# Patient Record
Sex: FEMALE | Race: ASIAN
[De-identification: names, ages, dates, MRNs, and addresses within clinical notes are randomized per-mention and may not be internally consistent; named-entity substitution may affect disease eponyms.]

---

## 2020-07-22 ENCOUNTER — APPOINTMENT (OUTPATIENT)
Dept: CARDIOLOGY | Facility: CLINIC | Age: 65
End: 2020-07-22
Payer: MEDICARE

## 2020-07-22 VITALS
RESPIRATION RATE: 15 BRPM | DIASTOLIC BLOOD PRESSURE: 76 MMHG | HEIGHT: 60 IN | HEART RATE: 76 BPM | BODY MASS INDEX: 21.2 KG/M2 | SYSTOLIC BLOOD PRESSURE: 109 MMHG | WEIGHT: 108 LBS

## 2020-07-22 DIAGNOSIS — Z82.49 FAMILY HISTORY OF ISCHEMIC HEART DISEASE AND OTHER DISEASES OF THE CIRCULATORY SYSTEM: ICD-10-CM

## 2020-07-22 PROBLEM — Z00.00 ENCOUNTER FOR PREVENTIVE HEALTH EXAMINATION: Status: ACTIVE | Noted: 2020-07-22

## 2020-07-22 PROCEDURE — 93000 ELECTROCARDIOGRAM COMPLETE: CPT

## 2020-07-22 PROCEDURE — 99203 OFFICE O/P NEW LOW 30 MIN: CPT

## 2020-07-22 NOTE — PHYSICAL EXAM
[Normal Appearance] : normal appearance [General Appearance - Well Developed] : well developed [General Appearance - Well Nourished] : well nourished [No Deformities] : no deformities [Well Groomed] : well groomed [General Appearance - In No Acute Distress] : no acute distress [Normal Conjunctiva] : the conjunctiva exhibited no abnormalities [Normal Oral Mucosa] : normal oral mucosa [Eyelids - No Xanthelasma] : the eyelids demonstrated no xanthelasmas [No Oral Pallor] : no oral pallor [Normal Jugular Venous A Waves Present] : normal jugular venous A waves present [No Oral Cyanosis] : no oral cyanosis [Normal Jugular Venous V Waves Present] : normal jugular venous V waves present [No Jugular Venous [place] A Waves] : no jugular venous [place] A waves [Heart Rate And Rhythm] : heart rate and rhythm were normal [Heart Sounds] : normal S1 and S2 [FreeTextEntry1] : 1/6 ADITYA QUICK [Exaggerated Use Of Accessory Muscles For Inspiration] : no accessory muscle use [Respiration, Rhythm And Depth] : normal respiratory rhythm and effort [Auscultation Breath Sounds / Voice Sounds] : lungs were clear to auscultation bilaterally [Abdomen Soft] : soft [Abdomen Tenderness] : non-tender [Abdomen Mass (___ Cm)] : no abdominal mass palpated [Nail Clubbing] : no clubbing of the fingernails [Petechial Hemorrhages (___cm)] : no petechial hemorrhages [Cyanosis, Localized] : no localized cyanosis [Skin Color & Pigmentation] : normal skin color and pigmentation [] : no ischemic changes [No Venous Stasis] : no venous stasis [No Skin Ulcers] : no skin ulcer [Skin Lesions] : no skin lesions [No Xanthoma] : no  xanthoma was observed [Oriented To Time, Place, And Person] : oriented to person, place, and time [Mood] : the mood was normal [Affect] : the affect was normal [No Anxiety] : not feeling anxious

## 2020-07-22 NOTE — REASON FOR VISIT
[Initial Evaluation] : an initial evaluation of [FreeTextEntry1] : 64 F referred for cardiology evaluation.

## 2020-07-22 NOTE — HISTORY OF PRESENT ILLNESS
[FreeTextEntry1] : Patient denies CP or SOB.\par No palpitations or dizziness noted.\par ET is stable. \par No cough or fevers noted.\par She is on no medications.

## 2020-07-22 NOTE — DISCUSSION/SUMMARY
[FreeTextEntry1] : 64 F for f/u.\par Patient denies any CP or SOB.\par Small soft ADITYA heard on exam, likely flow murmur.\par Reassurance and f/u for now.

## 2023-07-31 ENCOUNTER — INPATIENT (INPATIENT)
Facility: HOSPITAL | Age: 68
LOS: 1 days | Discharge: HOME | DRG: 603 | End: 2023-08-02
Attending: STUDENT IN AN ORGANIZED HEALTH CARE EDUCATION/TRAINING PROGRAM | Admitting: STUDENT IN AN ORGANIZED HEALTH CARE EDUCATION/TRAINING PROGRAM
Payer: MEDICARE

## 2023-07-31 VITALS
HEART RATE: 80 BPM | SYSTOLIC BLOOD PRESSURE: 104 MMHG | DIASTOLIC BLOOD PRESSURE: 62 MMHG | HEIGHT: 62 IN | OXYGEN SATURATION: 98 % | RESPIRATION RATE: 18 BRPM | TEMPERATURE: 98 F | WEIGHT: 108.03 LBS

## 2023-07-31 DIAGNOSIS — L03.116 CELLULITIS OF LEFT LOWER LIMB: ICD-10-CM

## 2023-07-31 LAB
ALBUMIN SERPL ELPH-MCNC: 4.4 G/DL — SIGNIFICANT CHANGE UP (ref 3.5–5.2)
ALP SERPL-CCNC: 75 U/L — SIGNIFICANT CHANGE UP (ref 30–115)
ALT FLD-CCNC: 26 U/L — SIGNIFICANT CHANGE UP (ref 0–41)
ANION GAP SERPL CALC-SCNC: 11 MMOL/L — SIGNIFICANT CHANGE UP (ref 7–14)
AST SERPL-CCNC: 42 U/L — HIGH (ref 0–41)
BASOPHILS # BLD AUTO: 0.02 K/UL — SIGNIFICANT CHANGE UP (ref 0–0.2)
BASOPHILS NFR BLD AUTO: 0.4 % — SIGNIFICANT CHANGE UP (ref 0–1)
BILIRUB SERPL-MCNC: 0.3 MG/DL — SIGNIFICANT CHANGE UP (ref 0.2–1.2)
BUN SERPL-MCNC: 21 MG/DL — HIGH (ref 10–20)
CALCIUM SERPL-MCNC: 9.8 MG/DL — SIGNIFICANT CHANGE UP (ref 8.4–10.5)
CHLORIDE SERPL-SCNC: 103 MMOL/L — SIGNIFICANT CHANGE UP (ref 98–110)
CO2 SERPL-SCNC: 25 MMOL/L — SIGNIFICANT CHANGE UP (ref 17–32)
CREAT SERPL-MCNC: 0.8 MG/DL — SIGNIFICANT CHANGE UP (ref 0.7–1.5)
EGFR: 80 ML/MIN/1.73M2 — SIGNIFICANT CHANGE UP
EOSINOPHIL # BLD AUTO: 0.17 K/UL — SIGNIFICANT CHANGE UP (ref 0–0.7)
EOSINOPHIL NFR BLD AUTO: 3.1 % — SIGNIFICANT CHANGE UP (ref 0–8)
GLUCOSE SERPL-MCNC: 89 MG/DL — SIGNIFICANT CHANGE UP (ref 70–99)
HCT VFR BLD CALC: 37.4 % — SIGNIFICANT CHANGE UP (ref 37–47)
HGB BLD-MCNC: 12.4 G/DL — SIGNIFICANT CHANGE UP (ref 12–16)
IMM GRANULOCYTES NFR BLD AUTO: 0.2 % — SIGNIFICANT CHANGE UP (ref 0.1–0.3)
LACTATE SERPL-SCNC: 0.8 MMOL/L — SIGNIFICANT CHANGE UP (ref 0.7–2)
LYMPHOCYTES # BLD AUTO: 1.37 K/UL — SIGNIFICANT CHANGE UP (ref 1.2–3.4)
LYMPHOCYTES # BLD AUTO: 25 % — SIGNIFICANT CHANGE UP (ref 20.5–51.1)
MCHC RBC-ENTMCNC: 31.9 PG — HIGH (ref 27–31)
MCHC RBC-ENTMCNC: 33.2 G/DL — SIGNIFICANT CHANGE UP (ref 32–37)
MCV RBC AUTO: 96.1 FL — SIGNIFICANT CHANGE UP (ref 81–99)
MONOCYTES # BLD AUTO: 0.31 K/UL — SIGNIFICANT CHANGE UP (ref 0.1–0.6)
MONOCYTES NFR BLD AUTO: 5.6 % — SIGNIFICANT CHANGE UP (ref 1.7–9.3)
NEUTROPHILS # BLD AUTO: 3.61 K/UL — SIGNIFICANT CHANGE UP (ref 1.4–6.5)
NEUTROPHILS NFR BLD AUTO: 65.7 % — SIGNIFICANT CHANGE UP (ref 42.2–75.2)
NRBC # BLD: 0 /100 WBCS — SIGNIFICANT CHANGE UP (ref 0–0)
PLATELET # BLD AUTO: 276 K/UL — SIGNIFICANT CHANGE UP (ref 130–400)
PMV BLD: 10.3 FL — SIGNIFICANT CHANGE UP (ref 7.4–10.4)
POTASSIUM SERPL-MCNC: 5 MMOL/L — SIGNIFICANT CHANGE UP (ref 3.5–5)
POTASSIUM SERPL-SCNC: 5 MMOL/L — SIGNIFICANT CHANGE UP (ref 3.5–5)
PROT SERPL-MCNC: 7 G/DL — SIGNIFICANT CHANGE UP (ref 6–8)
RBC # BLD: 3.89 M/UL — LOW (ref 4.2–5.4)
RBC # FLD: 13.8 % — SIGNIFICANT CHANGE UP (ref 11.5–14.5)
SODIUM SERPL-SCNC: 139 MMOL/L — SIGNIFICANT CHANGE UP (ref 135–146)
WBC # BLD: 5.49 K/UL — SIGNIFICANT CHANGE UP (ref 4.8–10.8)
WBC # FLD AUTO: 5.49 K/UL — SIGNIFICANT CHANGE UP (ref 4.8–10.8)

## 2023-07-31 PROCEDURE — 83735 ASSAY OF MAGNESIUM: CPT

## 2023-07-31 PROCEDURE — 73630 X-RAY EXAM OF FOOT: CPT | Mod: 26,LT

## 2023-07-31 PROCEDURE — 99285 EMERGENCY DEPT VISIT HI MDM: CPT

## 2023-07-31 PROCEDURE — 86140 C-REACTIVE PROTEIN: CPT

## 2023-07-31 PROCEDURE — 73590 X-RAY EXAM OF LOWER LEG: CPT | Mod: 26,LT

## 2023-07-31 PROCEDURE — 80048 BASIC METABOLIC PNL TOTAL CA: CPT

## 2023-07-31 PROCEDURE — G0378: CPT

## 2023-07-31 PROCEDURE — 86803 HEPATITIS C AB TEST: CPT

## 2023-07-31 PROCEDURE — 80053 COMPREHEN METABOLIC PANEL: CPT

## 2023-07-31 PROCEDURE — 99223 1ST HOSP IP/OBS HIGH 75: CPT

## 2023-07-31 PROCEDURE — 85652 RBC SED RATE AUTOMATED: CPT

## 2023-07-31 PROCEDURE — 85025 COMPLETE CBC W/AUTO DIFF WBC: CPT

## 2023-07-31 PROCEDURE — 36415 COLL VENOUS BLD VENIPUNCTURE: CPT

## 2023-07-31 RX ORDER — KETOROLAC TROMETHAMINE 30 MG/ML
15 SYRINGE (ML) INJECTION ONCE
Refills: 0 | Status: DISCONTINUED | OUTPATIENT
Start: 2023-07-31 | End: 2023-07-31

## 2023-07-31 RX ORDER — CEFAZOLIN SODIUM 1 G
1000 VIAL (EA) INJECTION EVERY 8 HOURS
Refills: 0 | Status: DISCONTINUED | OUTPATIENT
Start: 2023-07-31 | End: 2023-08-02

## 2023-07-31 RX ORDER — CEFAZOLIN SODIUM 1 G
2000 VIAL (EA) INJECTION ONCE
Refills: 0 | Status: COMPLETED | OUTPATIENT
Start: 2023-07-31 | End: 2023-07-31

## 2023-07-31 RX ORDER — LANOLIN ALCOHOL/MO/W.PET/CERES
5 CREAM (GRAM) TOPICAL AT BEDTIME
Refills: 0 | Status: DISCONTINUED | OUTPATIENT
Start: 2023-07-31 | End: 2023-08-02

## 2023-07-31 RX ORDER — ACETAMINOPHEN 500 MG
975 TABLET ORAL ONCE
Refills: 0 | Status: COMPLETED | OUTPATIENT
Start: 2023-07-31 | End: 2023-07-31

## 2023-07-31 RX ORDER — ACETAMINOPHEN 500 MG
650 TABLET ORAL EVERY 6 HOURS
Refills: 0 | Status: DISCONTINUED | OUTPATIENT
Start: 2023-07-31 | End: 2023-08-02

## 2023-07-31 RX ORDER — ENOXAPARIN SODIUM 100 MG/ML
40 INJECTION SUBCUTANEOUS EVERY 24 HOURS
Refills: 0 | Status: DISCONTINUED | OUTPATIENT
Start: 2023-07-31 | End: 2023-08-02

## 2023-07-31 RX ADMIN — Medication 975 MILLIGRAM(S): at 21:08

## 2023-07-31 RX ADMIN — Medication 100 MILLIGRAM(S): at 17:31

## 2023-07-31 RX ADMIN — Medication 15 MILLIGRAM(S): at 17:50

## 2023-07-31 RX ADMIN — Medication 975 MILLIGRAM(S): at 21:38

## 2023-07-31 RX ADMIN — Medication 15 MILLIGRAM(S): at 17:33

## 2023-07-31 NOTE — H&P ADULT - NSHPPHYSICALEXAM_GEN_ALL_CORE
T(C): 36.5 (07-31-23 @ 16:01), Max: 36.5 (07-31-23 @ 16:01)  HR: 80 (07-31-23 @ 16:01) (80 - 80)  BP: 104/62 (07-31-23 @ 16:01) (104/62 - 104/62)  RR: 18 (07-31-23 @ 16:01) (18 - 18)  SpO2: 98% (07-31-23 @ 16:01) (98% - 98%)    CONSTITUTIONAL: NAD, laying in bed comfortably  HEENT: EOMI, neck supple. AT, NC   RESP: No respiratory distress, no use of accessory muscles; CTAB  CV: RRR, +S1S2  GI: Soft, NT, ND, no rebound, no guarding  MSK: Normal normal muscle strength/tone in b/l UE and LE  SKIN: L foot erythema and swelling up to the shin and a 5x2cm erythematous lesion on the LLE  NEURO: Sensation intact in upper and lower extremities b/l to light touch   PSYCH: Appropriate insight/judgment; A&O x 3, mood and affect appropriate, recent/remote memory intact

## 2023-07-31 NOTE — ED PROVIDER NOTE - CLINICAL SUMMARY MEDICAL DECISION MAKING FREE TEXT BOX
This patient presents with initial presentation of local erythema, warmth, swelling concerning for cellulitis. Sensitivity/pain to light touch around the erythematous area. No lymphangitic spread visible and no fluid pockets or fluctuance concerning for abscess noted. Low concern for osteomyelitis or DVT. No immune compromise, bullae, pain out of proportion, or rapid progression concerning for necrotizing fasciitis. Patient to be admitted for failure of outpatient abx. Plan to admit for further evaluation and management.

## 2023-07-31 NOTE — ED PROVIDER NOTE - PHYSICAL EXAMINATION
CONSTITUTIONAL: Well-developed; well-nourished; in no acute distress, nontoxic appearing  SKIN: LLE: erythema/warmth to dorsum of L foot with radiation to shin. no fluctuance  HEAD: Normocephalic; atraumatic  CARD: S1, S2 normal, no murmur  RESP: No wheezes, rales or rhonchi. Good air movement bilaterally  EXT: Normal ROM.  NEURO: awake, alert, following commands, oriented, grossly unremarkable. No Focal deficits. GCS 15.   PSYCH: Cooperative, appropriate.

## 2023-07-31 NOTE — H&P ADULT - ASSESSMENT
Pt is a 66 y/o F with PMHx of HLD and osteopetrosis presenting to the ED with LLE redness/pain. Admitted to medicine for cellulitis.      #Cellulitis  - L foot and LLE affected no concerning sings (no pain out of proportion with exam, no pallor, pulses palpable, no fever, no leukocytosis)  - Failed multiple PO Abx regimen  - S/p Ancef in ED  - Start Cefazolin  - BCx pending    #HLD  - On home medication, but patient cannot recall and pharmacy is closed    #Osteoperosis  - Takes a once weekly pill (Alendronate??), but unable to confirm as patient cannot recall name and  pharmacy is closed    MED REC INCOMPLETE PLEASE CALL [facility] [phone] in AM FOR MED REC    # Misc  - DVT Prophylaxis: enoxaparin  - Diet: Diet, DASH/TLC  - MED REC INCOMPLETE PLEASE CALL PHARMACY IN AM  - DISPO: Acute - likely dc in 24-48hrs

## 2023-07-31 NOTE — ED PROVIDER NOTE - ATTENDING APP SHARED VISIT CONTRIBUTION OF CARE
I have reviewed and agree with the mid-level note, except as documented in my note below.    67-year-old female history of chronic back pain, denies significant PSH, non-smoker, denies daily EtOH use, now presents with left foot pain and swelling for the past 3 weeks, initially placed on Keflex on 7/11, symptoms were initially improving then started to get worse yesterday, was seen in urgent care yesterday and started on p.o. clindamycin but symptoms progressively got worse, denies fever, tactile temp, chills, paresthesias, focal weakness, or other associated complaints at present. Pt denies recent heavy lifting or trauma. No old chart available for review. I have reviewed and agree with the initial nursing note, except as documented in my note.    VSS, awake, alert, non-toxic appearing, chest CTAB, non-labored breathing, no w/r/r, +S1/S2, RRR, no m/r/g, abdomen soft, NT, ND, +BS, no peripheral edema or deformities, LLE: No pelvic, hip, knee, tib-fib, ankle or foot tenderness, appears symmetrical, no gross deformity, no lacerations or abrasions, no skin breakdown, no crepitus, point tenderness, + swelling, warmth, erythema of part of foot, ankle and distal tib-fib which is progressing in the ED, no induration, fluctuance, lymphangitis, purulence or lesions, active ROM and passive ROM intact, no joint laxity appreciated, motor and sensation intact distally, 5/5 strength, NV intact distally with 2+ DP pulses, compartments non-tense, pain not out of proportion to exam not appreciated, normal gait.

## 2023-07-31 NOTE — H&P ADULT - HISTORY OF PRESENT ILLNESS
Pt is a 66 y/o F with PMHx of HLD and osteopetrosis presenting to the ED with LLE redness/pain. Per pt on July 11 she was in her backyard where she was bit by a bug on dorsum of L foot. She said she noticed redness and swelling of the area and went to urgent care twice, first time was given Cephalexin with improvement and then reoccurrence of her symptoms. She was then given clindamycin and prednisone and also no improvement so she came to the ED. Denies fever, chills, chest pain, SOB, numbness, tingling, and weakness.    In ED vitals /62, HR 80, RR 18, T 97.7F, Sat 98% on RA    No significant lab findings.    XR of L foot performed, no official read.    Admitted to medicine

## 2023-07-31 NOTE — ED PROVIDER NOTE - OBJECTIVE STATEMENT
67 year old female, no past medical history, presents with LLE redness/pain. patient was bit by a bug to dorsum of L foot x3 weeks ago. patient noted gradual worsening of redness and pain since. patient fu with urgent care 7/11 started on oral abx and prednisone with transient improvement, however, symptoms returned. patient currently on clindamycin with persistent symptoms prompting ed eval. denies f/c, chest pain, shortness of breath, numbness/weakness, hip pain/back pain. no falls/trauma.

## 2023-07-31 NOTE — H&P ADULT - ATTENDING COMMENTS
66 y/o F with PMHx of HLD and osteopetrosis presenting to the ED with LLE redness/pain. Admitted to medicine for cellulitis.      Agree  with assessment  except for changes below.     IMPRESSION   Suspected  Non resolving Cellulitis   Non Septic   L foot and LLE affected no concerning sings   no pain out of proportion with exam, no pallor, pulses palpable, no fever, no leukocytosis  Not Improved on PO Abx   Send CRP and ESR  Continue   Cefazolin  f/u Bld culture       Hx of HLD  - continue home medication    Hx Osteopetrosis - c/w Alendronate     Complete Med Rec in the AM 66 y/o F with PMHx of HLD and osteopetrosis presenting to the ED with LLE redness/pain. Admitted to medicine for cellulitis.      Agree  with assessment  except for changes below.     PHYSICAL EXAM  GENERAL: NAD,  HEAD:  NCAT, EOMI, MM  NECK: Supple, Nontender  NERVOUS SYSTEM:  AAOx3, NFD  CHEST/LUNG: +bs b/l, No wheezing   HEART: +s1s2 RRR  ABDOMEN: soft, NT/ND  EXTREMITIES:  pp, no edema  SKIN: Left lower extremity  erythema and warmth   advancing up the leg     IMPRESSION   Suspected  Non resolving Cellulitis (s/p Bug Bite)   Non Septic   L foot and LLE affected no concerning sings   no pain out of proportion with exam, no pallor, pulses palpable, no fever, no leukocytosis  Not Improved on PO Abx   Send CRP and ESR  Continue   Cefazolin  f/u Bld culture       Hx of HLD  - continue home medication  or  Hx Osteoporosis - c/w Alendronate     Complete Med Rec in the AM    Seen on 07/31/23

## 2023-07-31 NOTE — ED ADULT TRIAGE NOTE - CHIEF COMPLAINT QUOTE
C/o left foot cellulitis from bug bite radiating up left leg x 3 weeks. Pt on Clindamycin with no relief

## 2023-07-31 NOTE — ED ADULT NURSE NOTE - NSFALLUNIVINTERV_ED_ALL_ED
Monitor for mental status changes and reorient to person, place, and time, as needed/Reinforce activity limits and safety measures with patient and family/Use of alarms - bed, stretcher, chair and/or video monitoring/Bed/Stretcher in lowest position, wheels locked, appropriate side rails in place/Call bell, personal items and telephone in reach/Instruct patient to call for assistance before getting out of bed/chair/stretcher/Non-slip footwear applied when patient is off stretcher/[place] to call system/Physically safe environment - no spills, clutter or unnecessary equipment/Purposeful proactive rounding/Room/bathroom lighting operational, light cord in reach

## 2023-07-31 NOTE — ED ADULT NURSE NOTE - OBJECTIVE STATEMENT
Pt c/o left leg cellulitis x3 weeks s/p bug bite. pt denies fevers. reports pain, redness and swelling.

## 2023-08-01 LAB
ALBUMIN SERPL ELPH-MCNC: 4.3 G/DL — SIGNIFICANT CHANGE UP (ref 3.5–5.2)
ALP SERPL-CCNC: 83 U/L — SIGNIFICANT CHANGE UP (ref 30–115)
ALT FLD-CCNC: 25 U/L — SIGNIFICANT CHANGE UP (ref 0–41)
ANION GAP SERPL CALC-SCNC: 7 MMOL/L — SIGNIFICANT CHANGE UP (ref 7–14)
AST SERPL-CCNC: 29 U/L — SIGNIFICANT CHANGE UP (ref 0–41)
BASOPHILS # BLD AUTO: 0.03 K/UL — SIGNIFICANT CHANGE UP (ref 0–0.2)
BASOPHILS NFR BLD AUTO: 0.7 % — SIGNIFICANT CHANGE UP (ref 0–1)
BILIRUB SERPL-MCNC: 0.5 MG/DL — SIGNIFICANT CHANGE UP (ref 0.2–1.2)
BUN SERPL-MCNC: 19 MG/DL — SIGNIFICANT CHANGE UP (ref 10–20)
CALCIUM SERPL-MCNC: 9.4 MG/DL — SIGNIFICANT CHANGE UP (ref 8.4–10.5)
CHLORIDE SERPL-SCNC: 107 MMOL/L — SIGNIFICANT CHANGE UP (ref 98–110)
CO2 SERPL-SCNC: 27 MMOL/L — SIGNIFICANT CHANGE UP (ref 17–32)
CREAT SERPL-MCNC: 0.7 MG/DL — SIGNIFICANT CHANGE UP (ref 0.7–1.5)
CRP SERPL-MCNC: 8.1 MG/L — HIGH
EGFR: 94 ML/MIN/1.73M2 — SIGNIFICANT CHANGE UP
EOSINOPHIL # BLD AUTO: 0.18 K/UL — SIGNIFICANT CHANGE UP (ref 0–0.7)
EOSINOPHIL NFR BLD AUTO: 4.3 % — SIGNIFICANT CHANGE UP (ref 0–8)
ERYTHROCYTE [SEDIMENTATION RATE] IN BLOOD: 14 MM/HR — SIGNIFICANT CHANGE UP (ref 0–20)
GLUCOSE SERPL-MCNC: 98 MG/DL — SIGNIFICANT CHANGE UP (ref 70–99)
HCT VFR BLD CALC: 39.3 % — SIGNIFICANT CHANGE UP (ref 37–47)
HCV AB S/CO SERPL IA: 0.03 COI — SIGNIFICANT CHANGE UP
HCV AB SERPL-IMP: SIGNIFICANT CHANGE UP
HGB BLD-MCNC: 13.1 G/DL — SIGNIFICANT CHANGE UP (ref 12–16)
IMM GRANULOCYTES NFR BLD AUTO: 0 % — LOW (ref 0.1–0.3)
LYMPHOCYTES # BLD AUTO: 1.16 K/UL — LOW (ref 1.2–3.4)
LYMPHOCYTES # BLD AUTO: 27.7 % — SIGNIFICANT CHANGE UP (ref 20.5–51.1)
MAGNESIUM SERPL-MCNC: 2.1 MG/DL — SIGNIFICANT CHANGE UP (ref 1.8–2.4)
MCHC RBC-ENTMCNC: 32.2 PG — HIGH (ref 27–31)
MCHC RBC-ENTMCNC: 33.3 G/DL — SIGNIFICANT CHANGE UP (ref 32–37)
MCV RBC AUTO: 96.6 FL — SIGNIFICANT CHANGE UP (ref 81–99)
MONOCYTES # BLD AUTO: 0.23 K/UL — SIGNIFICANT CHANGE UP (ref 0.1–0.6)
MONOCYTES NFR BLD AUTO: 5.5 % — SIGNIFICANT CHANGE UP (ref 1.7–9.3)
NEUTROPHILS # BLD AUTO: 2.59 K/UL — SIGNIFICANT CHANGE UP (ref 1.4–6.5)
NEUTROPHILS NFR BLD AUTO: 61.8 % — SIGNIFICANT CHANGE UP (ref 42.2–75.2)
NRBC # BLD: 0 /100 WBCS — SIGNIFICANT CHANGE UP (ref 0–0)
PLATELET # BLD AUTO: 291 K/UL — SIGNIFICANT CHANGE UP (ref 130–400)
PMV BLD: 9.6 FL — SIGNIFICANT CHANGE UP (ref 7.4–10.4)
POTASSIUM SERPL-MCNC: 4.6 MMOL/L — SIGNIFICANT CHANGE UP (ref 3.5–5)
POTASSIUM SERPL-SCNC: 4.6 MMOL/L — SIGNIFICANT CHANGE UP (ref 3.5–5)
PROT SERPL-MCNC: 6.7 G/DL — SIGNIFICANT CHANGE UP (ref 6–8)
RBC # BLD: 4.07 M/UL — LOW (ref 4.2–5.4)
RBC # FLD: 13.9 % — SIGNIFICANT CHANGE UP (ref 11.5–14.5)
SODIUM SERPL-SCNC: 141 MMOL/L — SIGNIFICANT CHANGE UP (ref 135–146)
WBC # BLD: 4.19 K/UL — LOW (ref 4.8–10.8)
WBC # FLD AUTO: 4.19 K/UL — LOW (ref 4.8–10.8)

## 2023-08-01 PROCEDURE — 99232 SBSQ HOSP IP/OBS MODERATE 35: CPT

## 2023-08-01 RX ORDER — ATORVASTATIN CALCIUM 80 MG/1
20 TABLET, FILM COATED ORAL AT BEDTIME
Refills: 0 | Status: DISCONTINUED | OUTPATIENT
Start: 2023-08-01 | End: 2023-08-02

## 2023-08-01 RX ORDER — ATORVASTATIN CALCIUM 80 MG/1
1 TABLET, FILM COATED ORAL
Refills: 0 | DISCHARGE

## 2023-08-01 RX ORDER — ALENDRONATE SODIUM 70 MG/1
1 TABLET ORAL
Refills: 0 | DISCHARGE

## 2023-08-01 RX ADMIN — Medication 100 MILLIGRAM(S): at 21:46

## 2023-08-01 RX ADMIN — ATORVASTATIN CALCIUM 20 MILLIGRAM(S): 80 TABLET, FILM COATED ORAL at 21:47

## 2023-08-01 RX ADMIN — Medication 100 MILLIGRAM(S): at 15:10

## 2023-08-01 RX ADMIN — Medication 100 MILLIGRAM(S): at 06:10

## 2023-08-01 NOTE — PROGRESS NOTE ADULT - SUBJECTIVE AND OBJECTIVE BOX
Patient is a 67y old  Female who presents with a chief complaint of R foot cellulitis (31 Jul 2023 22:11)      SUBJECTIVE / OVERNIGHT EVENTS: Pt is walking around well without any issues. She states that she had a mosquito bite which spread to this Lt foot and lower calf rash. She went to the urgent care on 7/11/23 and got cephalexin and then went back on 7/12/23 and then received clindamycin as well as op. Pt returned to the ED as she felt like her leg was hot and swollen and was more red. She felt like it slightly improved with the steroids and then with the cefazolin started overnight. She has a second mosquito bite on the medial aspect of her left calf that also has a bright red circular rash.  ADDITIONAL REVIEW OF SYSTEMS: as above    MEDICATIONS  (STANDING):  atorvastatin 20 milliGRAM(s) Oral at bedtime  ceFAZolin   IVPB 1000 milliGRAM(s) IV Intermittent every 8 hours  enoxaparin Injectable 40 milliGRAM(s) SubCutaneous every 24 hours    MEDICATIONS  (PRN):  acetaminophen     Tablet .. 650 milliGRAM(s) Oral every 6 hours PRN Mild Pain (1 - 3), Moderate Pain (4 - 6), Severe Pain (7 - 10)  melatonin 5 milliGRAM(s) Oral at bedtime PRN Insomnia      CAPILLARY BLOOD GLUCOSE        I&O's Summary      PHYSICAL EXAM:  Vital Signs Last 24 Hrs  T(C): 36.8 (01 Aug 2023 08:09), Max: 36.8 (01 Aug 2023 08:09)  T(F): 98.3 (01 Aug 2023 08:09), Max: 98.3 (01 Aug 2023 08:09)  HR: 81 (01 Aug 2023 08:09) (80 - 87)  BP: 101/57 (01 Aug 2023 08:09) (87/50 - 104/62)  BP(mean): 63 (01 Aug 2023 00:47) (63 - 63)  RR: 17 (01 Aug 2023 08:09) (17 - 18)  SpO2: 95% (01 Aug 2023 08:09) (95% - 98%)    Parameters below as of 01 Aug 2023 08:09  Patient On (Oxygen Delivery Method): room air      CONSTITUTIONAL: NAD  EYES: PERRLA; conjunctiva clear  ENMT: Moist oral mucosa, no pharyngeal injection or exudates; normal dentition  NECK: Supple, no palpable masses; no thyromegaly  RESPIRATORY: Normal respiratory effort; lungs are clear to auscultation bilaterally  CARDIOVASCULAR: Regular rate and rhythm, normal S1 and S2, no murmur/rub/gallop; No lower extremity edema; Peripheral pulses are 2+ bilaterally  ABDOMEN: Nontender to palpation, normoactive bowel sounds, no rebound/guarding; No hepatosplenomegaly  MUSCULOSKELETAL:  Normal gait; no clubbing or cyanosis of digits; Lt ankle not swollen  PSYCH: A+O to person, place, and time; affect appropriate  NEUROLOGY: CN 2-12 are intact and symmetric; no gross sensory deficits   SKIN: Red, warm, erythematous rash on dorsum of foot sparing toes and extending to above ankle as well as a similar circular red rash on medial mid calf.     LABS:                        13.1   4.19  )-----------( 291      ( 01 Aug 2023 08:27 )             39.3     08-01    141  |  107  |  19  ----------------------------<  98  4.6   |  27  |  0.7    Ca    9.4      01 Aug 2023 08:27  Mg     2.1     08-01    TPro  6.7  /  Alb  4.3  /  TBili  0.5  /  DBili  x   /  AST  29  /  ALT  25  /  AlkPhos  83  08-01          Urinalysis Basic - ( 01 Aug 2023 08:27 )    Color: x / Appearance: x / SG: x / pH: x  Gluc: 98 mg/dL / Ketone: x  / Bili: x / Urobili: x   Blood: x / Protein: x / Nitrite: x   Leuk Esterase: x / RBC: x / WBC x   Sq Epi: x / Non Sq Epi: x / Bacteria: x          RADIOLOGY & ADDITIONAL TESTS:  Results Reviewed:   Imaging Personally Reviewed:  Electrocardiogram Personally Reviewed:    COORDINATION OF CARE:  Care Discussed with Consultants/Other Providers [Y/N]:  Prior or Outpatient Records Reviewed [Y/N]:

## 2023-08-01 NOTE — PATIENT PROFILE ADULT - INTERNATIONAL TRAVEL
Keep an eye on the redness around your ostomy.  If you have increasing pain or fever or vomiting or abdominal pain please return to the emergency room.  Otherwise just get it checked with your primary in 2 to 3 days  
No

## 2023-08-01 NOTE — PROGRESS NOTE ADULT - ASSESSMENT
68 y/o F with PMHx of HLD and osteoporosis presenting to the ED with LLE redness/pain for recurrent cellulitis.     #Recurrent non-remitting cellulitis  - concerning for gram positive bacteremia  - s/p cephalexin and clindamycin and prednisone  - XR Lt foot: No acute fracture or dislocation. No soft tissue defect identified. No soft tissue gas or radiopaque foreign bodies.  - no indication for further imaging  - obtain a LE duplex  - CRP 8, ESR wnl  - no fever, leukocytosis with LS or bands, lactate wnl  - blood cultures  - no drainage to culture  - continue cefazolin  - ID consult  - pt walking around well with no issues; no PT indicated    #HLD  - continue lipitor    #Osteoporosis  - hold alendronate; resume upon discharge    # Misc  - DVT Prophylaxis: enoxaparin  - Diet: Diet, DASH/TLC   66 y/o F with PMHx of HLD and osteoporosis presenting to the ED with LLE redness/pain for recurrent cellulitis.     #Recurrent non-remitting cellulitis  - concerning for gram positive bacteremia  - s/p cephalexin and clindamycin and prednisone  - XR Lt foot: No acute fracture or dislocation. No soft tissue defect identified. No soft tissue gas or radiopaque foreign bodies.  - no indication for further imaging  - obtain a LE duplex  - CRP 8, ESR wnl  - no fever, leukocytosis with LS or bands, lactate wnl  - blood cultures  - no drainage to culture  - continue cefazolin  - ID consult  - pt walking around well with no issues; no PT indicated    #HLD  - continue lipitor    #Osteoporosis  - hold alendronate; resume upon discharge    # Misc  - DVT Prophylaxis: enoxaparin  - Diet: Diet, DASH/TLC    Handoff: No fever or leukocytosis and pt is not immunocompromised. Would normally state this looks like prolonged hyperpigmentation from a previous case of cellulitis, however clinical picture is a little concerning of possible Gm + bacteremia. Will follow cultures and continue IV abx; ordered a LE duplex but low pre-test probability of a clot. If cultures are neg, can discharge and complete an extended course of antibiotics. ID consulted. Rash not concerning for a tick bite.

## 2023-08-02 ENCOUNTER — TRANSCRIPTION ENCOUNTER (OUTPATIENT)
Age: 68
End: 2023-08-02

## 2023-08-02 VITALS
SYSTOLIC BLOOD PRESSURE: 101 MMHG | DIASTOLIC BLOOD PRESSURE: 58 MMHG | HEART RATE: 70 BPM | TEMPERATURE: 98 F | RESPIRATION RATE: 18 BRPM

## 2023-08-02 LAB
ANION GAP SERPL CALC-SCNC: 9 MMOL/L — SIGNIFICANT CHANGE UP (ref 7–14)
BASOPHILS # BLD AUTO: 0.02 K/UL — SIGNIFICANT CHANGE UP (ref 0–0.2)
BASOPHILS NFR BLD AUTO: 0.6 % — SIGNIFICANT CHANGE UP (ref 0–1)
BUN SERPL-MCNC: 16 MG/DL — SIGNIFICANT CHANGE UP (ref 10–20)
CALCIUM SERPL-MCNC: 8.9 MG/DL — SIGNIFICANT CHANGE UP (ref 8.4–10.5)
CHLORIDE SERPL-SCNC: 106 MMOL/L — SIGNIFICANT CHANGE UP (ref 98–110)
CO2 SERPL-SCNC: 27 MMOL/L — SIGNIFICANT CHANGE UP (ref 17–32)
CREAT SERPL-MCNC: 0.6 MG/DL — LOW (ref 0.7–1.5)
EGFR: 98 ML/MIN/1.73M2 — SIGNIFICANT CHANGE UP
EOSINOPHIL # BLD AUTO: 0.2 K/UL — SIGNIFICANT CHANGE UP (ref 0–0.7)
EOSINOPHIL NFR BLD AUTO: 5.8 % — SIGNIFICANT CHANGE UP (ref 0–8)
GLUCOSE SERPL-MCNC: 98 MG/DL — SIGNIFICANT CHANGE UP (ref 70–99)
HCT VFR BLD CALC: 37.5 % — SIGNIFICANT CHANGE UP (ref 37–47)
HGB BLD-MCNC: 12.6 G/DL — SIGNIFICANT CHANGE UP (ref 12–16)
IMM GRANULOCYTES NFR BLD AUTO: 0.3 % — SIGNIFICANT CHANGE UP (ref 0.1–0.3)
LYMPHOCYTES # BLD AUTO: 1.22 K/UL — SIGNIFICANT CHANGE UP (ref 1.2–3.4)
LYMPHOCYTES # BLD AUTO: 35.3 % — SIGNIFICANT CHANGE UP (ref 20.5–51.1)
MAGNESIUM SERPL-MCNC: 2.2 MG/DL — SIGNIFICANT CHANGE UP (ref 1.8–2.4)
MCHC RBC-ENTMCNC: 32.1 PG — HIGH (ref 27–31)
MCHC RBC-ENTMCNC: 33.6 G/DL — SIGNIFICANT CHANGE UP (ref 32–37)
MCV RBC AUTO: 95.7 FL — SIGNIFICANT CHANGE UP (ref 81–99)
MONOCYTES # BLD AUTO: 0.28 K/UL — SIGNIFICANT CHANGE UP (ref 0.1–0.6)
MONOCYTES NFR BLD AUTO: 8.1 % — SIGNIFICANT CHANGE UP (ref 1.7–9.3)
NEUTROPHILS # BLD AUTO: 1.73 K/UL — SIGNIFICANT CHANGE UP (ref 1.4–6.5)
NEUTROPHILS NFR BLD AUTO: 49.9 % — SIGNIFICANT CHANGE UP (ref 42.2–75.2)
NRBC # BLD: 0 /100 WBCS — SIGNIFICANT CHANGE UP (ref 0–0)
PLATELET # BLD AUTO: 278 K/UL — SIGNIFICANT CHANGE UP (ref 130–400)
PMV BLD: 9.6 FL — SIGNIFICANT CHANGE UP (ref 7.4–10.4)
POTASSIUM SERPL-MCNC: 4.2 MMOL/L — SIGNIFICANT CHANGE UP (ref 3.5–5)
POTASSIUM SERPL-SCNC: 4.2 MMOL/L — SIGNIFICANT CHANGE UP (ref 3.5–5)
RBC # BLD: 3.92 M/UL — LOW (ref 4.2–5.4)
RBC # FLD: 13.8 % — SIGNIFICANT CHANGE UP (ref 11.5–14.5)
SODIUM SERPL-SCNC: 142 MMOL/L — SIGNIFICANT CHANGE UP (ref 135–146)
WBC # BLD: 3.46 K/UL — LOW (ref 4.8–10.8)
WBC # FLD AUTO: 3.46 K/UL — LOW (ref 4.8–10.8)

## 2023-08-02 PROCEDURE — 99239 HOSP IP/OBS DSCHRG MGMT >30: CPT

## 2023-08-02 RX ORDER — MELOXICAM 15 MG/1
1 TABLET ORAL
Refills: 0 | DISCHARGE

## 2023-08-02 RX ADMIN — ENOXAPARIN SODIUM 40 MILLIGRAM(S): 100 INJECTION SUBCUTANEOUS at 06:12

## 2023-08-02 RX ADMIN — Medication 100 MILLIGRAM(S): at 06:12

## 2023-08-02 RX ADMIN — Medication 100 MILLIGRAM(S): at 13:12

## 2023-08-02 NOTE — CONSULT NOTE ADULT - ASSESSMENT
68 y/o F with PMHx of HLD and osteopetrosis presenting to the ED with LLE redness/pain. Per pt on July 11 she was in her backyard where she was bit by a bug on dorsum of L foot. She said she noticed redness and swelling of the area and went to urgent care twice, first time was given Cephalexin with improvement and then reoccurrence of her symptoms. She was then given clindamycin and prednisone and also no improvement so she came to the ED. Denies fever, chills, chest pain, SOB, numbness, tingling, and weakness.    In ED vitals /62, HR 80, RR 18, T 97.7F, Sat 98% on RA  No significant lab findings. Admitted to medicine management of recurrent cellulitis.     Assessment / Recommendations  Stable for discharge on PO Keflex and doxycyline for 5 days  66 y/o F with PMHx of HLD and osteopetrosis presenting to the ED with LLE redness/pain. Per pt on July 11 she was in her backyard where she was bit by a bug on dorsum of L foot. She said she noticed redness and swelling of the area and went to urgent care twice, first time was given Cephalexin with improvement and then reoccurrence of her symptoms. She was then given clindamycin and prednisone and also no improvement so she came to the ED. Denies fever, chills, chest pain, SOB, numbness, tingling, and weakness.    In ED vitals /62, HR 80, RR 18, T 97.7F, Sat 98% on RA  No significant lab findings. Admitted to medicine management of recurrent cellulitis.     Assessment / Recommendations  Stable for discharge on PO Keflex 500mg four times daily  and doxycyline 100mg BID for 5 days

## 2023-08-02 NOTE — CONSULT NOTE ADULT - ATTENDING COMMENTS
I have personally seen and examined this patient.  I have fully participated in the care of this patient.  I have reviewed all pertinent clinical information, including history, physical exam, plan and note.  I have reviewed all pertinent clinical information and reviewed all relevant imaging and diagnostic studies personally.  My addendum includes the final recommendations and supersedes the resident's note.       ?Cellulitis after mosquito bite  Was in the garden, component of dermatitis?  s/p keflex then clinda/pred as outpatient   2 isolated areas, L medial shin and dorsal foot    - po keflex + doxy po x 5 days as above  - monitor closely     If any questions, please call or send a message on [employer] Teams  Please continue to update ID with any pertinent new laboratory or radiographic findings  Spectra [id number]

## 2023-08-02 NOTE — DISCHARGE NOTE NURSING/CASE MANAGEMENT/SOCIAL WORK - PATIENT PORTAL LINK FT
You can access the FollowMyHealth Patient Portal offered by [facility] by registering at the following website: [email or web address]. By joining [facility]’s FollowMyHealth portal, you will also be able to view your health information using other applications (apps) compatible with our system.

## 2023-08-02 NOTE — CONSULT NOTE ADULT - SUBJECTIVE AND OBJECTIVE BOX
Consultation Requested by:    Patient is a 67y old  Female who presents with a chief complaint of R foot cellulitis (02 Aug 2023 10:39)    HPI:  Pt is a 66 y/o F with PMHx of HLD and osteopetrosis presenting to the ED with LLE redness/pain. Per pt on July 11 she was in her backyard where she was bit by a bug on dorsum of L foot. She said she noticed redness and swelling of the area and went to urgent care twice, first time was given Cephalexin with improvement and then reoccurrence of her symptoms. She was then given clindamycin and prednisone and also no improvement so she came to the ED. Denies fever, chills, chest pain, SOB, numbness, tingling, and weakness.    In ED vitals /62, HR 80, RR 18, T 97.7F, Sat 98% on RA    No significant lab findings.    XR of L foot performed, no official read.    Admitted to medicine (31 Jul 2023 22:11)      REVIEW OF SYSTEMS  All review of systems negative, except for those marked:  General:		[] Abnormal:  	[] Night Sweats		[] Fever		[] Weight Loss  Pulmonary/Cough:	[] Abnormal:  Cardiac/Chest Pain:	[] Abnormal:  Gastrointestinal:	[] Abnormal:  Eyes:			[] Abnormal:  ENT:			[] Abnormal:  Dysuria:		[] Abnormal:  Musculoskeletal	:	[] Abnormal:  Endocrine:		[] Abnormal:  Lymph Nodes:		[] Abnormal:  Headache:		[] Abnormal:  Skin:			[] Abnormal:  Allergy/Immune:	[] Abnormal:  Psychiatric:		[] Abnormal:  [*] All other review of systems negative  [] Unable to obtain (explain):    Recent Ill Contacts:	[*] No	[] Yes:  Recent Travel History:	[*] No	[] Yes:  Recent Animal/Insect Exposure/Tick Bites:	[] No	[*] Yes: Bug bite on 7/11/2023    Allergies    No Known Allergies    Intolerances      Antimicrobials:  ceFAZolin   IVPB 1000 milliGRAM(s) IV Intermittent every 8 hours      Other Medications:  acetaminophen     Tablet .. 650 milliGRAM(s) Oral every 6 hours PRN  atorvastatin 20 milliGRAM(s) Oral at bedtime  enoxaparin Injectable 40 milliGRAM(s) SubCutaneous every 24 hours  melatonin 5 milliGRAM(s) Oral at bedtime PRN      FAMILY HISTORY:    PAST MEDICAL & SURGICAL HISTORY:  Hyperlipidemia      Osteoporosis        Daily     Daily   Head Circumference:  Vital Signs Last 24 Hrs  T(C): 36.7 (02 Aug 2023 08:06), Max: 36.8 (01 Aug 2023 17:45)  T(F): 98 (02 Aug 2023 08:06), Max: 98.2 (01 Aug 2023 17:45)  HR: 70 (02 Aug 2023 08:06) (70 - 81)  BP: 101/58 (02 Aug 2023 08:06) (101/58 - 112/60)  RR: 18 (02 Aug 2023 08:06) (18 - 18)  SpO2: 98% (01 Aug 2023 17:45) (98% - 98%)        PHYSICAL EXAM  All physical exam findings normal, except for those marked:  General:	Normal: alert, neither acutely nor chronically ill-appearing, well developed/well   .		nourished, no respiratory distress  .		  Eyes		Normal: no conjunctival injection, no discharge, no photophobia, intact   .		extraocular movements, sclera not icteric  .		  ENT:		Normal: normal tympanic membranes; external ear normal, nares normal without   .		discharge, no pharyngeal erythema or exudates, no oral mucosal lesions, normal   .		tongue and lips  .		  Neck		Normal: supple, full range of motion, no nuchal rigidity  .		  Lymph Nodes	Normal: normal size and consistency, non-tender  .		  Cardiovascular	Normal: regular rate and variability; Normal S1, S2; No murmur  .		  Respiratory	Normal: no wheezing or crackles, bilateral audible breath sounds, no retractions  .		  Abdominal	Normal: soft; non-distended; non-tender; no hepatosplenomegaly or masses  .		  		Normal: normal external genitalia, no rash  .		  Extremities	Normal: FROM x4, no cyanosis or edema, symmetric pulses  .		[*] Abnormal: left foot and Leg erythematous patch (improved since admission)  Skin		Normal: skin intact and not indurated; no rash, no desquamation  .		  Neurologic	Normal: alert, oriented as age-appropriate, affect appropriate; no weakness, no   .		facial asymmetry, moves all extremities, normal gait-child older than 18 months  .		  Musculoskeletal		Normal: no joint swelling, erythema, or tenderness; full range of motion   .			with no contractures; no muscle tenderness; no clubbing; no cyanosis;   .			no edema    Lab Results:                        12.6   3.46  )-----------( 278      ( 02 Aug 2023 06:09 )             37.5     08-02    142  |  106  |  16  ----------------------------<  98  4.2   |  27  |  0.6<L>    Ca    8.9      02 Aug 2023 06:09  Mg     2.2     08-02    TPro  6.7  /  Alb  4.3  /  TBili  0.5  /  DBili  x   /  AST  29  /  ALT  25  /  AlkPhos  83  08-01    LIVER FUNCTIONS - ( 01 Aug 2023 08:27 )  Alb: 4.3 g/dL / Pro: 6.7 g/dL / ALK PHOS: 83 U/L / ALT: 25 U/L / AST: 29 U/L / GGT: x              Consultation Requested by:    Patient is a 67y old  Female who presents with a chief complaint of R foot cellulitis (02 Aug 2023 10:39)    HPI:  Pt is a 68 y/o F with PMHx of HLD and osteopetrosis presenting to the ED with LLE redness/pain. Per pt on July 11 she was in her backyard where she was bit by a bug on dorsum of L foot. She said she noticed redness and swelling of the area and went to urgent care twice, first time was given Cephalexin with improvement and then reoccurrence of her symptoms. She was then given clindamycin and prednisone and also no improvement so she came to the ED. Denies fever, chills, chest pain, SOB, numbness, tingling, and weakness.    In ED vitals /62, HR 80, RR 18, T 97.7F, Sat 98% on RA    No significant lab findings.    XR of L foot performed, no official read.    Admitted to medicine (31 Jul 2023 22:11)      REVIEW OF SYSTEMS  All review of systems negative, except for those marked:  General:		NAD  	  Pulmonary/Cough:	[] Abnormal:  Cardiac/Chest Pain:	[] Abnormal:  Gastrointestinal:	[] Abnormal:  Eyes:			[] Abnormal:  ENT:			[] Abnormal:  Dysuria:		[] Abnormal:  Musculoskeletal	:	[] Abnormal:  Endocrine:		[] Abnormal:  Lymph Nodes:		[] Abnormal:  Headache:		[] Abnormal:  Skin:			[] Abnormal:  Allergy/Immune:	[] Abnormal:  Psychiatric:		[] Abnormal:  [*] All other review of systems negative      Recent Ill Contacts:	[*] No	[] Yes:  Recent Travel History:	[*] No	[] Yes:  Recent Animal/Insect Exposure/Tick Bites:	[] No	[*] Yes:  Mosquito bite on 7/11/2023    Allergies    No Known Allergies    Intolerances      Antimicrobials:  ceFAZolin   IVPB 1000 milliGRAM(s) IV Intermittent every 8 hours      Other Medications:  acetaminophen     Tablet .. 650 milliGRAM(s) Oral every 6 hours PRN  atorvastatin 20 milliGRAM(s) Oral at bedtime  enoxaparin Injectable 40 milliGRAM(s) SubCutaneous every 24 hours  melatonin 5 milliGRAM(s) Oral at bedtime PRN      FAMILY HISTORY:    PAST MEDICAL & SURGICAL HISTORY:  Hyperlipidemia      Osteoporosis        Daily     Daily   Head Circumference:  Vital Signs Last 24 Hrs  T(C): 36.7 (02 Aug 2023 08:06), Max: 36.8 (01 Aug 2023 17:45)  T(F): 98 (02 Aug 2023 08:06), Max: 98.2 (01 Aug 2023 17:45)  HR: 70 (02 Aug 2023 08:06) (70 - 81)  BP: 101/58 (02 Aug 2023 08:06) (101/58 - 112/60)  RR: 18 (02 Aug 2023 08:06) (18 - 18)  SpO2: 98% (01 Aug 2023 17:45) (98% - 98%)        PHYSICAL EXAM  All physical exam findings normal, except for those marked:  General:	Normal: alert, neither acutely nor chronically ill-appearing, well developed/well   .		nourished, no respiratory distress  .		  Eyes		Normal: no conjunctival injection, no discharge, no photophobia, intact   .		extraocular movements, sclera not icteric  .		  ENT:		Normal: normal tympanic membranes; external ear normal, nares normal without   .		discharge, no pharyngeal erythema or exudates, no oral mucosal lesions, normal   .		tongue and lips  .		  Neck		Normal: supple, full range of motion, no nuchal rigidity  .		  Lymph Nodes	Normal: normal size and consistency, non-tender  .		  Cardiovascular	Normal: regular rate and variability; Normal S1, S2; No murmur  .		  Respiratory	Normal: no wheezing or crackles, bilateral audible breath sounds, no retractions  .		  Abdominal	Normal: soft; non-distended; non-tender; no hepatosplenomegaly or masses  .		  		Normal: normal external genitalia, no rash  .		  Extremities	Normal: FROM x4, no cyanosis or edema, symmetric pulses  .		[*] Abnormal: left foot and Leg erythematous patch (improved since admission)  Skin                  Erythematous patch over the dorsum of left foot extended to the mid leg, another erythematous patch noted on the medial aspect of the leg                           No open wounds noted 		  .		  Neurologic	Normal: alert, oriented as age-appropriate, affect appropriate; no weakness, no   .		facial asymmetry, moves all extremities  .		  Musculoskeletal		Normal: no joint swelling, erythema, or tenderness; full range of motion   .			with no contractures; no muscle tenderness; no clubbing; no cyanosis;   .			no edema    Lab Results:                        12.6   3.46  )-----------( 278      ( 02 Aug 2023 06:09 )             37.5     08-02    142  |  106  |  16  ----------------------------<  98  4.2   |  27  |  0.6<L>    Ca    8.9      02 Aug 2023 06:09  Mg     2.2     08-02    TPro  6.7  /  Alb  4.3  /  TBili  0.5  /  DBili  x   /  AST  29  /  ALT  25  /  AlkPhos  83  08-01    LIVER FUNCTIONS - ( 01 Aug 2023 08:27 )  Alb: 4.3 g/dL / Pro: 6.7 g/dL / ALK PHOS: 83 U/L / ALT: 25 U/L / AST: 29 U/L / GGT: x

## 2023-08-02 NOTE — DISCHARGE NOTE PROVIDER - NSDCMRMEDTOKEN_GEN_ALL_CORE_FT
alendronate 70 mg oral tablet: 1 orally once a week  Lipitor 20 mg oral tablet: 1 orally once a day   alendronate 70 mg oral tablet: 1 orally once a week  doxycycline monohydrate 100 mg oral tablet: 1 tab(s) orally 2 times a day  Lipitor 20 mg oral tablet: 1 orally once a day   alendronate 70 mg oral tablet: 1 orally once a week  amoxicillin-clavulanate 875 mg-125 mg oral tablet: 1 tab(s) orally 2 times a day  doxycycline monohydrate 100 mg oral tablet: 1 tab(s) orally 2 times a day  Lipitor 20 mg oral tablet: 1 orally once a day

## 2023-08-02 NOTE — DISCHARGE NOTE PROVIDER - HOSPITAL COURSE
68 y/o F with PMHx of HLD and osteopetrosis presenting to the ED with LLE redness/pain. Per pt on July 11 she was in her backyard where she was bit by a bug on dorsum of L foot. She said she noticed redness and swelling of the area and went to urgent care twice, first time was given Cephalexin with improvement and then reoccurrence of her symptoms. She was then given clindamycin and prednisone and also no improvement so she came to the ED. Denies fever, chills, chest pain, SOB, numbness, tingling, and weakness.    In ED vitals /62, HR 80, RR 18, T 97.7F, Sat 98% on RA  No significant lab findings. Admitted to medicine management of recurrent cellulitis.     #Recurrent non-remitting cellulitis- improved  - no fever, leukocytosis with LS or bands, lactate wnl, non-purulent  - CRP 8, ESR wnl  - s/p cephalexin and clindamycin and prednisone outpatient  - XR Lt foot: No acute fracture or dislocation. No soft tissue defect identified. No soft tissue gas or radiopaque foreign bodies.  - no indication for further imaging  - LE duplex pending  - blood cultures negative  - s/p cefazolin for 2 days, will DC on PO Abx   - Pt able to ambulate well with no issues    #HLD- continue home lipitor  #Osteoporosis- resume alendronate upon discharge    Pt. is medically stable to be discharged home today.   68 y/o F with PMHx of HLD and osteopetrosis presenting to the ED with LLE redness/pain. Per pt on July 11 she was in her backyard where she was bit by a bug on dorsum of L foot. She said she noticed redness and swelling of the area and went to urgent care twice, first time was given Cephalexin with improvement and then reoccurrence of her symptoms. She was then given clindamycin and prednisone and also no improvement so she came to the ED. Denies fever, chills, chest pain, SOB, numbness, tingling, and weakness.    In ED vitals /62, HR 80, RR 18, T 97.7F, Sat 98% on RA  No significant lab findings. Admitted to medicine management of recurrent cellulitis.     #Recurrent non-remitting cellulitis- improved  - no fever, leukocytosis with LS or bands, lactate wnl, non-purulent  - CRP 8, ESR wnl  - s/p cephalexin and clindamycin and prednisone outpatient  - XR Lt foot: No acute fracture or dislocation. No soft tissue defect identified. No soft tissue gas or radiopaque foreign bodies.  - no indication for further imaging  - LE duplex pending  - blood cultures negative  - s/p cefazolin for 2 days, will DC on PO Doxy for 5 more days  - Pt able to ambulate well with no issues    #HLD- continue home lipitor  #Osteoporosis- resume alendronate upon discharge    Pt. is medically stable to be discharged home today.   68 y/o F with PMHx of HLD and osteopetrosis presenting to the ED with LLE redness/pain. Per pt on July 11 she was in her backyard where she was bit by a bug on dorsum of L foot. She said she noticed redness and swelling of the area and went to urgent care twice, first time was given Cephalexin with improvement and then reoccurrence of her symptoms. She was then given clindamycin and prednisone and also no improvement so she came to the ED. Denies fever, chills, chest pain, SOB, numbness, tingling, and weakness.    In ED vitals /62, HR 80, RR 18, T 97.7F, Sat 98% on RA  No significant lab findings. Admitted to medicine management of recurrent cellulitis.     #Recurrent non-remitting cellulitis- improved  - no fever, leukocytosis with LS or bands, lactate wnl, non-purulent  - CRP 8, ESR wnl  - s/p cephalexin and clindamycin and prednisone outpatient  - XR Lt foot: No acute fracture or dislocation. No soft tissue defect identified. No soft tissue gas or radiopaque foreign bodies.  - no indication for further imaging  - LE duplex pending  - blood cultures negative  - s/p cefazolin for 2 days, will DC on PO Doxy for 5 more days  - Pt able to ambulate well with no issues    #HLD- continue home lipitor  #Osteoporosis- resume alendronate upon discharge    Pt. is medically stable to be discharged home today.    #attending comments    - cellulitics resolving on iv abx  - can dc home w po abx    66 y/o F with PMHx of HLD and osteopetrosis presenting to the ED with LLE redness/pain. Per pt on July 11 she was in her backyard where she was bit by a bug on dorsum of L foot. She said she noticed redness and swelling of the area and went to urgent care twice, first time was given Cephalexin with improvement and then reoccurrence of her symptoms. She was then given clindamycin and prednisone and also no improvement so she came to the ED. Denies fever, chills, chest pain, SOB, numbness, tingling, and weakness.    In ED vitals /62, HR 80, RR 18, T 97.7F, Sat 98% on RA  No significant lab findings. Admitted to medicine management of recurrent cellulitis.     #Recurrent non-remitting cellulitis- improved  - no fever, leukocytosis with LS or bands, lactate wnl, non-purulent  - CRP 8, ESR wnl  - s/p cephalexin and clindamycin and prednisone outpatient  - XR Lt foot: No acute fracture or dislocation. No soft tissue defect identified. No soft tissue gas or radiopaque foreign bodies.  - no indication for further imaging  - LE duplex pending  - blood cultures negative  - s/p cefazolin for 2 days, will DC on PO Augmentin and Doxy for 5 more days  - Pt able to ambulate well with no issues    #HLD- continue home lipitor  #Osteoporosis- resume alendronate upon discharge    Pt. is medically stable to be discharged home today.    #attending comments    - cellulitics resolving on iv abx  - can dc home w po abx

## 2023-08-02 NOTE — DISCHARGE NOTE PROVIDER - ATTENDING DISCHARGE PHYSICAL EXAMINATION:
CONSTITUTIONAL: No acute distress, well-developed, well-groomed, AAOx3  HEAD: Atraumatic, normocephalic  EYES: EOM intact, PERRLA, conjunctiva and sclera clear  ENT: Supple, no masses, no thyromegaly, no bruits, no JVD; moist mucous membranes  PULMONARY: Clear to auscultation bilaterally; no wheezes, rales, or rhonchi  CARDIOVASCULAR: Regular rate and rhythm; no murmurs, rubs, or gallops  GASTROINTESTINAL: Soft, non-tender, non-distended; bowel sounds present  MUSCULOSKELETAL: 2+ peripheral pulses; no clubbing, no cyanosis, no edema, mild right ankle and leg redness w no swelling or tenderness   NEUROLOGY: non-focal  SKIN: No rashes or lesions; warm and dry

## 2023-08-02 NOTE — DISCHARGE NOTE PROVIDER - CARE PROVIDER_API CALL
Alvarado Odonnell  Internal Medicine  [address] [place] [id number]  Phone: [phone]  Fax: [phone]  Follow Up Time: 2 weeks

## 2023-08-02 NOTE — DISCHARGE NOTE PROVIDER - NSDCCPCAREPLAN_GEN_ALL_CORE_FT
PRINCIPAL DISCHARGE DIAGNOSIS  Diagnosis: Cellulitis of left lower extremity  Assessment and Plan of Treatment: You were found to have cellulitis of left lower leg and were treated with IV antibiotics. Your symptoms are improved now and you are able to ambulate without any difficulty. You are medically cleared to be discharged on oral antibiotics. Take the prescribed antibiotic medicine you are given as directed.Take it even if you feel better. It treats the infection and stops it from returning. Not taking all the medicine can make future infections hard to treat. Keep the infected area clean. When possible, raise the infected area above the level of your heart. This helps keep swelling down. Apply clean bandages as advised. Wash your hands often to prevent spreading the infection. In the future, wash your hands before and after you touch cuts, scratches, or bandages. This will help prevent infection.   Call your doctor or returnt o the emergency room or call 911 immediately if you have any of the following: Difficulty or pain when moving the joints above or below the infected area, Discharge or pus draining from the area, rever of 100.4°F (38°C) or higher, or as directed by your healthcare provider, pain that gets worse in or around the infected site, redness that gets worse in or around the infected area, particularly if the area of redness expands to a wider area, shaking chills, swelling of the infected area, vomiting.  Please follow up with all your doctor appointment as instructed and take medication as prescribed.

## 2023-08-06 LAB
CULTURE RESULTS: SIGNIFICANT CHANGE UP
CULTURE RESULTS: SIGNIFICANT CHANGE UP
SPECIMEN SOURCE: SIGNIFICANT CHANGE UP
SPECIMEN SOURCE: SIGNIFICANT CHANGE UP

## 2023-08-07 DIAGNOSIS — E78.5 HYPERLIPIDEMIA, UNSPECIFIED: ICD-10-CM

## 2023-08-07 DIAGNOSIS — S90.862D INSECT BITE (NONVENOMOUS), LEFT FOOT, SUBSEQUENT ENCOUNTER: ICD-10-CM

## 2023-08-07 DIAGNOSIS — G89.29 OTHER CHRONIC PAIN: ICD-10-CM

## 2023-08-07 DIAGNOSIS — M81.0 AGE-RELATED OSTEOPOROSIS WITHOUT CURRENT PATHOLOGICAL FRACTURE: ICD-10-CM

## 2023-08-07 DIAGNOSIS — M54.9 DORSALGIA, UNSPECIFIED: ICD-10-CM

## 2023-08-07 DIAGNOSIS — L03.116 CELLULITIS OF LEFT LOWER LIMB: ICD-10-CM

## 2025-04-18 ENCOUNTER — NON-APPOINTMENT (OUTPATIENT)
Age: 70
End: 2025-04-18

## 2025-04-18 ENCOUNTER — APPOINTMENT (OUTPATIENT)
Dept: ORTHOPEDIC SURGERY | Facility: CLINIC | Age: 70
End: 2025-04-18
Payer: MEDICARE

## 2025-04-18 VITALS — HEIGHT: 60 IN | WEIGHT: 105 LBS | BODY MASS INDEX: 20.62 KG/M2

## 2025-04-18 DIAGNOSIS — M81.0 AGE-RELATED OSTEOPOROSIS W/OUT CURRENT PATHOLOGICAL FRACTURE: ICD-10-CM

## 2025-04-18 DIAGNOSIS — M19.90 UNSPECIFIED OSTEOARTHRITIS, UNSPECIFIED SITE: ICD-10-CM

## 2025-04-18 DIAGNOSIS — M53.3 SACROCOCCYGEAL DISORDERS, NOT ELSEWHERE CLASSIFIED: ICD-10-CM

## 2025-04-18 PROCEDURE — 73502 X-RAY EXAM HIP UNI 2-3 VIEWS: CPT | Mod: RT

## 2025-04-18 PROCEDURE — 99203 OFFICE O/P NEW LOW 30 MIN: CPT | Mod: 25
